# Patient Record
Sex: FEMALE | Race: WHITE | Employment: UNEMPLOYED | URBAN - METROPOLITAN AREA
[De-identification: names, ages, dates, MRNs, and addresses within clinical notes are randomized per-mention and may not be internally consistent; named-entity substitution may affect disease eponyms.]

---

## 2019-08-12 ENCOUNTER — OFFICE VISIT (OUTPATIENT)
Dept: FAMILY MEDICINE CLINIC | Facility: CLINIC | Age: 24
End: 2019-08-12
Payer: COMMERCIAL

## 2019-08-12 DIAGNOSIS — Z11.1 SCREENING EXAMINATION FOR PULMONARY TUBERCULOSIS: Primary | ICD-10-CM

## 2019-08-12 DIAGNOSIS — Z11.1 SCREENING FOR TUBERCULOSIS: ICD-10-CM

## 2019-08-12 PROCEDURE — 86580 TB INTRADERMAL TEST: CPT

## 2019-08-12 NOTE — PROGRESS NOTES
Requests 2 stepTB skin test.  TUBERCULOSIS SCREENING QUESTIONNAIRE    · Live vaccines in the past month? no  · Any steroid medication in the past month? no  · History of BCG vaccine? no  · If female, are you currently pregnant?   no    · Are you curren

## 2019-08-14 ENCOUNTER — OFFICE VISIT (OUTPATIENT)
Dept: FAMILY MEDICINE CLINIC | Facility: CLINIC | Age: 24
End: 2019-08-14
Payer: COMMERCIAL

## 2019-08-14 DIAGNOSIS — Z11.1 SCREENING FOR TUBERCULOSIS: Primary | ICD-10-CM

## 2019-08-14 LAB — INDURATION (): 0 MM (ref 0–11)

## 2019-08-14 NOTE — PROGRESS NOTES
Patient presents for PPD read. Test placed on right forearm    Results: 0 mm induration. Letter of results printed and given patient.  States does thinks she needs a second PPD test, explained no earlier than a week prior to the first being given, p

## 2020-09-22 ENCOUNTER — OFFICE VISIT (OUTPATIENT)
Dept: OBGYN CLINIC | Facility: CLINIC | Age: 25
End: 2020-09-22
Payer: COMMERCIAL

## 2020-09-22 VITALS
HEIGHT: 67 IN | WEIGHT: 144.88 LBS | DIASTOLIC BLOOD PRESSURE: 64 MMHG | SYSTOLIC BLOOD PRESSURE: 102 MMHG | BODY MASS INDEX: 22.74 KG/M2

## 2020-09-22 DIAGNOSIS — Z00.00 ANNUAL PHYSICAL EXAM: Primary | ICD-10-CM

## 2020-09-22 PROBLEM — Z86.19 HISTORY OF HERPES GENITALIS: Status: ACTIVE | Noted: 2020-09-22

## 2020-09-22 PROCEDURE — 87591 N.GONORRHOEAE DNA AMP PROB: CPT | Performed by: OBSTETRICS & GYNECOLOGY

## 2020-09-22 PROCEDURE — 87491 CHLMYD TRACH DNA AMP PROBE: CPT | Performed by: OBSTETRICS & GYNECOLOGY

## 2020-09-22 PROCEDURE — 3008F BODY MASS INDEX DOCD: CPT | Performed by: OBSTETRICS & GYNECOLOGY

## 2020-09-22 PROCEDURE — 87106 FUNGI IDENTIFICATION YEAST: CPT | Performed by: OBSTETRICS & GYNECOLOGY

## 2020-09-22 PROCEDURE — 3078F DIAST BP <80 MM HG: CPT | Performed by: OBSTETRICS & GYNECOLOGY

## 2020-09-22 PROCEDURE — 99385 PREV VISIT NEW AGE 18-39: CPT | Performed by: OBSTETRICS & GYNECOLOGY

## 2020-09-22 PROCEDURE — 90471 IMMUNIZATION ADMIN: CPT | Performed by: OBSTETRICS & GYNECOLOGY

## 2020-09-22 PROCEDURE — 87808 TRICHOMONAS ASSAY W/OPTIC: CPT | Performed by: OBSTETRICS & GYNECOLOGY

## 2020-09-22 PROCEDURE — 3074F SYST BP LT 130 MM HG: CPT | Performed by: OBSTETRICS & GYNECOLOGY

## 2020-09-22 PROCEDURE — 90686 IIV4 VACC NO PRSV 0.5 ML IM: CPT | Performed by: OBSTETRICS & GYNECOLOGY

## 2020-09-22 PROCEDURE — 87205 SMEAR GRAM STAIN: CPT | Performed by: OBSTETRICS & GYNECOLOGY

## 2020-09-22 RX ORDER — LEVONORGESTREL 52 MG/1
1 INTRAUTERINE DEVICE INTRAUTERINE ONCE
COMMUNITY

## 2020-09-22 NOTE — H&P
[facility]  Obstetrics and Gynecology  History & Physical    CC: Patient presents with:  New Patient  Annual: pap       Subjective:     HPI: Brigette Ridley is a 21year old [place] female here for a well women exam. Patient reports Smoking status: Never Smoker      Smokeless tobacco: Never Used    Substance and Sexual Activity      Alcohol use: Yes        Frequency: 2-4 times a month      Drug use: Never      Sexual activity: Not on file    Lifestyle      Physical activity        D Body mass index is 22.69 kg/m².     Exam with MA codie Rosa Grams present:   GENERAL: well developed, well nourished, in no apparent distress, alert and orientated X 3  PSYCH: mood and affect stable   SKIN: no rashes, no lesions  HEENT: tracey

## 2020-09-23 LAB
C TRACH DNA SPEC QL NAA+PROBE: NEGATIVE
N GONORRHOEA DNA SPEC QL NAA+PROBE: NEGATIVE

## 2020-09-24 LAB
GENITAL VAGINOSIS SCREEN: NEGATIVE
TRICHOMONAS SCREEN: NEGATIVE

## 2020-09-24 RX ORDER — FLUCONAZOLE 150 MG/1
150 TABLET ORAL ONCE
Qty: 2 TABLET | Refills: 0 | Status: SHIPPED | OUTPATIENT
Start: 2020-09-24 | End: 2020-09-24

## 2020-10-01 ENCOUNTER — OFFICE VISIT (OUTPATIENT)
Dept: FAMILY MEDICINE CLINIC | Facility: CLINIC | Age: 25
End: 2020-10-01
Payer: COMMERCIAL

## 2020-10-01 VITALS
OXYGEN SATURATION: 98 % | BODY MASS INDEX: 22.76 KG/M2 | HEART RATE: 80 BPM | DIASTOLIC BLOOD PRESSURE: 78 MMHG | WEIGHT: 145 LBS | SYSTOLIC BLOOD PRESSURE: 118 MMHG | TEMPERATURE: 95 F | HEIGHT: 67 IN

## 2020-10-01 DIAGNOSIS — G43.109 MIGRAINE WITH AURA AND WITHOUT STATUS MIGRAINOSUS, NOT INTRACTABLE: ICD-10-CM

## 2020-10-01 DIAGNOSIS — Z00.00 PHYSICAL EXAM, ANNUAL: Primary | ICD-10-CM

## 2020-10-01 PROCEDURE — 3074F SYST BP LT 130 MM HG: CPT | Performed by: FAMILY MEDICINE

## 2020-10-01 PROCEDURE — 3008F BODY MASS INDEX DOCD: CPT | Performed by: FAMILY MEDICINE

## 2020-10-01 PROCEDURE — 3078F DIAST BP <80 MM HG: CPT | Performed by: FAMILY MEDICINE

## 2020-10-01 PROCEDURE — 99385 PREV VISIT NEW AGE 18-39: CPT | Performed by: FAMILY MEDICINE

## 2020-10-01 NOTE — PROGRESS NOTES
HPI:   Nelson Renae is a 21year old female who presents for a complete physical exam.     Migraines. Dx at 6yo. Takes motrin prn and works. Does have aura of loss of vision, numbness/tingling, and aphasic. Had MRI head as a child.      Dx with gen History    Tobacco Use      Smoking status: Never Smoker      Smokeless tobacco: Never Used    Alcohol use: Yes      Frequency: 2-4 times a month    Drug use: Never           EXAM:   Wt Readings from Last 6 Encounters:  10/01/20 : 145 lb (65.8 kg)  09/22/2 (GC/Chlamydia/HIV): recently negative    All questions were answered during the visit and the patient verbalizes understanding. She will return in one year for next [address] or sooner as needed.     Meds & Refills for this Visit:  Requested Prescriptions      No

## 2020-10-20 ENCOUNTER — NURSE ONLY (OUTPATIENT)
Dept: URGENT CARE | Age: 25
End: 2020-10-20

## 2020-10-20 ENCOUNTER — TELEPHONE (OUTPATIENT)
Dept: SCHEDULING | Age: 25
End: 2020-10-20

## 2020-10-20 VITALS — TEMPERATURE: 97.7 F

## 2020-10-20 DIAGNOSIS — Z11.1 SCREENING-PULMONARY TB: Primary | ICD-10-CM

## 2020-10-20 PROCEDURE — 86580 TB INTRADERMAL TEST: CPT | Performed by: NURSE PRACTITIONER

## 2020-10-22 ENCOUNTER — WALK IN (OUTPATIENT)
Dept: URGENT CARE | Age: 25
End: 2020-10-22

## 2020-10-22 VITALS — TEMPERATURE: 98.1 F

## 2020-10-22 DIAGNOSIS — Z11.1 SCREENING EXAMINATION FOR PULMONARY TUBERCULOSIS: Primary | ICD-10-CM

## 2020-10-22 LAB
INDURATION: 0 MM (ref 0–?)
SKIN TEST RESULT: NEGATIVE

## 2020-10-22 PROCEDURE — X1094 NO CHARGE VISIT: HCPCS | Performed by: NURSE PRACTITIONER

## 2021-11-09 ENCOUNTER — OFFICE VISIT (OUTPATIENT)
Dept: FAMILY MEDICINE CLINIC | Facility: CLINIC | Age: 26
End: 2021-11-09
Payer: COMMERCIAL

## 2021-11-09 VITALS
HEART RATE: 79 BPM | SYSTOLIC BLOOD PRESSURE: 112 MMHG | DIASTOLIC BLOOD PRESSURE: 62 MMHG | HEIGHT: 67 IN | WEIGHT: 147 LBS | BODY MASS INDEX: 23.07 KG/M2 | OXYGEN SATURATION: 99 %

## 2021-11-09 DIAGNOSIS — G43.109 MIGRAINE WITH AURA AND WITHOUT STATUS MIGRAINOSUS, NOT INTRACTABLE: ICD-10-CM

## 2021-11-09 DIAGNOSIS — Z00.00 PHYSICAL EXAM, ANNUAL: Primary | ICD-10-CM

## 2021-11-09 PROCEDURE — 3074F SYST BP LT 130 MM HG: CPT | Performed by: FAMILY MEDICINE

## 2021-11-09 PROCEDURE — 99395 PREV VISIT EST AGE 18-39: CPT | Performed by: FAMILY MEDICINE

## 2021-11-09 PROCEDURE — 90471 IMMUNIZATION ADMIN: CPT | Performed by: FAMILY MEDICINE

## 2021-11-09 PROCEDURE — 90686 IIV4 VACC NO PRSV 0.5 ML IM: CPT | Performed by: FAMILY MEDICINE

## 2021-11-09 PROCEDURE — 3078F DIAST BP <80 MM HG: CPT | Performed by: FAMILY MEDICINE

## 2021-11-09 PROCEDURE — 3008F BODY MASS INDEX DOCD: CPT | Performed by: FAMILY MEDICINE

## 2021-11-09 NOTE — PROGRESS NOTES
Attempted to contact pt. No answer. LVM with lab result. Advised in VM will be mailing external labs. Advised to call clinic back w/ further questions/concerns.       HPI:   Guzman Mahajan is a 31year old female who presents for a complete physical exam.     Migraines: Would like to see neurology. Gets 5-10 per month now. More frequent since started work last year. Has aura w/ vision changes and numb hands.  Takes mo Encounters:  11/09/21 : 147 lb (66.7 kg)  10/01/20 : 145 lb (65.8 kg)  09/22/20 : 144 lb 14.4 oz (65.7 kg)    Body mass index is 23.02 kg/m².    /62   Pulse 79   Ht 5' 7\" (1.702 m)   Wt 147 lb (66.7 kg)   LMP 11/02/2021   SpO2 99%   Breastfeeding No discuss with OBGYN    All questions were answered during the visit and the patient verbalizes understanding. She will return in one year for next [address] or sooner as needed.     Meds & Refills for this Visit:  Requested Prescriptions      No prescriptions requ

## 2021-11-23 ENCOUNTER — OFFICE VISIT (OUTPATIENT)
Dept: OBGYN CLINIC | Facility: CLINIC | Age: 26
End: 2021-11-23
Payer: COMMERCIAL

## 2021-11-23 VITALS
HEIGHT: 67 IN | WEIGHT: 144.31 LBS | BODY MASS INDEX: 22.65 KG/M2 | SYSTOLIC BLOOD PRESSURE: 124 MMHG | DIASTOLIC BLOOD PRESSURE: 72 MMHG

## 2021-11-23 DIAGNOSIS — Z30.431 IUD CHECK UP: Primary | ICD-10-CM

## 2021-11-23 PROCEDURE — 3008F BODY MASS INDEX DOCD: CPT | Performed by: OBSTETRICS & GYNECOLOGY

## 2021-11-23 PROCEDURE — 3074F SYST BP LT 130 MM HG: CPT | Performed by: OBSTETRICS & GYNECOLOGY

## 2021-11-23 PROCEDURE — 99395 PREV VISIT EST AGE 18-39: CPT | Performed by: OBSTETRICS & GYNECOLOGY

## 2021-11-23 PROCEDURE — 3078F DIAST BP <80 MM HG: CPT | Performed by: OBSTETRICS & GYNECOLOGY

## 2021-11-23 NOTE — PROGRESS NOTES
[facility]  Obstetrics and Gynecology  Annual Follow Up    Subjective:     Jonn Guillen is a 31year old [place] [facility] female who presents with c/o Patient presents with:   Annual: physical     The patient reports no complaints annual    Patient Active Problem List:     History of herpes genitalis     Migraine with aura and without status migrainosus, not intractable        Plan:     Annual   - Normal breast and pelvic exam   - US for lost IUD strings   - Procedure visit for left

## 2022-05-10 ENCOUNTER — PATIENT MESSAGE (OUTPATIENT)
Dept: FAMILY MEDICINE CLINIC | Facility: CLINIC | Age: 27
End: 2022-05-10

## 2022-05-10 NOTE — TELEPHONE ENCOUNTER
Sent [employer] asking if patient needs TB skin test or quantiferon blood test.       ----- Message -----  From: Alexandrea Whaley  Sent: 5/10/2022   1:34 PM CDT  To: Em Rn Triage  Subject: TB Testing? Hello,     I need to get a TB test for work and wondering if I can get it done at the [facility]. location?      [facility]

## 2022-05-12 NOTE — TELEPHONE ENCOUNTER
Pt needs a nurse visit appt. She cannot complete the PPD test in our lab. Please have her call & schedule RN visit with my MA Ryanne Doyle.

## 2022-10-26 ENCOUNTER — OFFICE VISIT (OUTPATIENT)
Dept: OBGYN CLINIC | Facility: CLINIC | Age: 27
End: 2022-10-26
Payer: COMMERCIAL

## 2022-10-26 VITALS
BODY MASS INDEX: 23.28 KG/M2 | HEIGHT: 67 IN | WEIGHT: 148.31 LBS | SYSTOLIC BLOOD PRESSURE: 126 MMHG | DIASTOLIC BLOOD PRESSURE: 72 MMHG

## 2022-10-26 DIAGNOSIS — Z01.419 WOMEN'S ANNUAL ROUTINE GYNECOLOGICAL EXAMINATION: Primary | ICD-10-CM

## 2022-10-26 PROCEDURE — 87808 TRICHOMONAS ASSAY W/OPTIC: CPT | Performed by: OBSTETRICS & GYNECOLOGY

## 2022-10-26 PROCEDURE — 87106 FUNGI IDENTIFICATION YEAST: CPT | Performed by: OBSTETRICS & GYNECOLOGY

## 2022-10-26 PROCEDURE — 3074F SYST BP LT 130 MM HG: CPT | Performed by: OBSTETRICS & GYNECOLOGY

## 2022-10-26 PROCEDURE — 3008F BODY MASS INDEX DOCD: CPT | Performed by: OBSTETRICS & GYNECOLOGY

## 2022-10-26 PROCEDURE — 3078F DIAST BP <80 MM HG: CPT | Performed by: OBSTETRICS & GYNECOLOGY

## 2022-10-26 PROCEDURE — 87591 N.GONORRHOEAE DNA AMP PROB: CPT | Performed by: OBSTETRICS & GYNECOLOGY

## 2022-10-26 PROCEDURE — 87491 CHLMYD TRACH DNA AMP PROBE: CPT | Performed by: OBSTETRICS & GYNECOLOGY

## 2022-10-26 PROCEDURE — 87205 SMEAR GRAM STAIN: CPT | Performed by: OBSTETRICS & GYNECOLOGY

## 2022-10-26 PROCEDURE — 99395 PREV VISIT EST AGE 18-39: CPT | Performed by: OBSTETRICS & GYNECOLOGY

## 2022-10-27 LAB
C TRACH DNA SPEC QL NAA+PROBE: NEGATIVE
N GONORRHOEA DNA SPEC QL NAA+PROBE: NEGATIVE

## 2022-10-28 LAB
GENITAL VAGINOSIS SCREEN: NEGATIVE
TRICHOMONAS SCREEN: NEGATIVE

## 2023-08-29 ENCOUNTER — TELEPHONE (OUTPATIENT)
Dept: OBGYN CLINIC | Facility: CLINIC | Age: 28
End: 2023-08-29

## 2023-11-08 ENCOUNTER — OFFICE VISIT (OUTPATIENT)
Dept: OBGYN CLINIC | Facility: CLINIC | Age: 28
End: 2023-11-08
Payer: COMMERCIAL

## 2023-11-08 VITALS
BODY MASS INDEX: 22.49 KG/M2 | DIASTOLIC BLOOD PRESSURE: 74 MMHG | SYSTOLIC BLOOD PRESSURE: 128 MMHG | WEIGHT: 143.31 LBS | HEIGHT: 67 IN

## 2023-11-08 DIAGNOSIS — Z01.419 ENCOUNTER FOR ANNUAL ROUTINE GYNECOLOGICAL EXAMINATION: Primary | ICD-10-CM

## 2023-11-08 PROCEDURE — 3008F BODY MASS INDEX DOCD: CPT | Performed by: OBSTETRICS & GYNECOLOGY

## 2023-11-08 PROCEDURE — 88175 CYTOPATH C/V AUTO FLUID REDO: CPT | Performed by: OBSTETRICS & GYNECOLOGY

## 2023-11-08 PROCEDURE — 99395 PREV VISIT EST AGE 18-39: CPT | Performed by: OBSTETRICS & GYNECOLOGY

## 2023-11-08 PROCEDURE — 90471 IMMUNIZATION ADMIN: CPT | Performed by: OBSTETRICS & GYNECOLOGY

## 2023-11-08 PROCEDURE — 90686 IIV4 VACC NO PRSV 0.5 ML IM: CPT | Performed by: OBSTETRICS & GYNECOLOGY

## 2023-11-08 PROCEDURE — 3078F DIAST BP <80 MM HG: CPT | Performed by: OBSTETRICS & GYNECOLOGY

## 2023-11-08 PROCEDURE — 3074F SYST BP LT 130 MM HG: CPT | Performed by: OBSTETRICS & GYNECOLOGY

## 2024-10-30 ENCOUNTER — OFFICE VISIT (OUTPATIENT)
Dept: OBGYN CLINIC | Facility: CLINIC | Age: 29
End: 2024-10-30
Payer: COMMERCIAL

## 2024-10-30 VITALS
SYSTOLIC BLOOD PRESSURE: 118 MMHG | BODY MASS INDEX: 22.46 KG/M2 | DIASTOLIC BLOOD PRESSURE: 68 MMHG | HEIGHT: 67 IN | WEIGHT: 143.13 LBS

## 2024-10-30 DIAGNOSIS — N90.89 VULVAR LESION: ICD-10-CM

## 2024-10-30 DIAGNOSIS — Z01.419 ENCOUNTER FOR ANNUAL ROUTINE GYNECOLOGICAL EXAMINATION: Primary | ICD-10-CM

## 2024-10-30 PROCEDURE — 11420 EXC H-F-NK-SP B9+MARG 0.5/<: CPT | Performed by: OBSTETRICS & GYNECOLOGY

## 2024-10-30 PROCEDURE — 88305 TISSUE EXAM BY PATHOLOGIST: CPT | Performed by: OBSTETRICS & GYNECOLOGY

## 2024-10-30 PROCEDURE — 3078F DIAST BP <80 MM HG: CPT | Performed by: OBSTETRICS & GYNECOLOGY

## 2024-10-30 PROCEDURE — 99395 PREV VISIT EST AGE 18-39: CPT | Performed by: OBSTETRICS & GYNECOLOGY

## 2024-10-30 PROCEDURE — 3074F SYST BP LT 130 MM HG: CPT | Performed by: OBSTETRICS & GYNECOLOGY

## 2024-10-30 PROCEDURE — 3008F BODY MASS INDEX DOCD: CPT | Performed by: OBSTETRICS & GYNECOLOGY

## 2024-10-30 NOTE — PROCEDURES
Vulvar excision procedure note    Consent signed.  Procedure discussed with patient in detail including indication, risk, benefits, alternatives and complications.    Procedure:  About 5 mm Vulvar lesion noted on left labia majora.   1% lidocaine with epinephrine injected.   Grasped and elevated and excised with scissors.   Silver nitrate to base.   Hemostasis noted.       Findings:  Left labia skin tag    Impression:  Likely begin skin tag.     Dr. Misael Acosta MD    [facility]     This note was created by [employer] voice recognition. Errors in content may be related to improper recognition by the system; efforts to review and correct have been done but errors may still exist. Please be advised the primary purpose of this note is for me to communicate medical care. Standard sentence structure is not always used. Medical terminology and medical abbreviations may be used. There may be grammatical, typographical, and automated fill ins that may have errors missed in proofreading.

## 2024-10-30 NOTE — PROGRESS NOTES
[facility]  Obstetrics and Gynecology  Annual Follow Up    Subjective:     Marlena Hicks is a 29 year old  female who presents with c/o   Chief Complaint   Patient presents with    Annual     The patient reports menses are monthly. Never had amenorrhea with IUD. IUD placed 2017 Recommend removal and replacement by 2025. No self breast exam concerns. Pap up to date.     Noted her left labia skin tag is bothersome and irritating.      Depression Screening (PHQ-2/PHQ-9): Over the LAST 2 WEEKS   Little interest or pleasure in doing things (over the last two weeks)?: Not at all  Little interest or pleasure in doing things: Not at all    Feeling down, depressed, or hopeless (over the last two weeks)?: Not at all  Feeling down, depressed, or hopeless: Not at all    PHQ-2 SCORE: 0  PHQ-2 SCORE: 0         Review of Systems:  General: no complaints  Eyes: no complaints  Respiratory: no complaints  Cardiovascular: no complaints  GI: no complaints  : See HPI  Hematological/lymphatic: no complaints  Breast: no complaints  Psychiatric: no complaints  Endocrine:no complaints  Neurological: no complaints  Immunological: no complaints  Musculoskeletal:no complaints    HISTORY:  Past Medical History:    Genital herpes simplex    Migraine with aura      Past Surgical History:   Procedure Laterality Date    Knee surgery      ACL repair [employer] year of [employer] school      Family History   Problem Relation Age of Onset    Hypertension Mother     High Cholesterol Mother     Hypertension Father     High Cholesterol Father       Social History     Socioeconomic History    Marital status: Single   Tobacco Use    Smoking status: Never    Smokeless tobacco: Never   Substance and Sexual Activity    Alcohol use: Yes    Drug use: Never    Sexual activity: Yes     Birth control/protection: I.U.D.     Comment: Mirena   Other Topics Concern    Blood Transfusions No   Social History Narrative    Lives  with partner a dog and 2 cats    No abuse           Objective:     Vitals:    10/30/24 1630   BP: 118/68   Weight: 143 lb 1.6 oz (64.9 kg)   Height: 67\"         Body mass index is 22.41 kg/m².    Exam with MA codie Kostasarabellaadriane Haywood present:   GENERAL: well developed, well nourished, in no apparent distress, alert and orientated X 3  PSYCH: mood and affect stable   SKIN: no rashes, no lesions  LUNGS: respiration unlabored  CARDIOVASCULAR: no peripheral edema or varicosities, skin warm and dry  BREASTS: bilaterally nontender, no palpable masses, no nipple discharge with bilateral piercing's in place, no skin changes, no axillary adenopathy  ABDOMEN: Soft, non distended; non tender, no masses  GYNE/:   External Genitalia: normal, stable left labia majora skin tag (removed see procedure note), good perineal support  Urethra: meatus normal   Bladder: well supported  Vagina: normal mucosa, no lesions, normal discharge   Cervix: normal os, no lesions or bleeding. Small amount of strings seen at the os.   Cul-de-sac: normal  R/V: normal perineum, no hemorrhoids  EXTREMITIES:  Normal range of motion, strength 5/5 walking.     Labs:  Reviewed last pap.     Assessment:     Marlena Hicks is a 29 year old  female who presents for Annual    Patient Active Problem List   Diagnosis    History of herpes genitalis    Migraine with aura and without status migrainosus, not intractable         Plan:     Annual  - Normal breast and pelvic exam.   - Pap up to date.     2.   Vulvar lesion  - Likely consistent with skin tag.   - Removed today. See procedure note.      All of the findings and plan were discussed with the patient.  She notes understanding and agrees with the plan of care.  All questions were answered to the best of my ability at this time.    RTC in 1 year or sooner if needed    Dr. Misael Acosta MD    [facility]     This note was created by [email or web address] voice recognition. Errors in content may be related to  improper recognition by the system; efforts to review and correct have been done but errors may still exist. Please be advised the primary purpose of this note is for me to communicate medical care. Standard sentence structure is not always used. Medical terminology and medical abbreviations may be used. There may be grammatical, typographical, and automated fill ins that may have errors missed in proofreading.

## (undated) NOTE — LETTER
August 14, 2019    Sweetie Bela  [address] [place] [address]      Dear Margy Pickering: The following are the results of your recent tests. Please review the list of test results.   Your result is the value on the left; we have also supplie

## (undated) NOTE — LETTER
Marlena Juan Hicks, :10/7/1995    CONSENT FOR PROCEDURE/SEDATION    1. I authorize the performance upon Marlena Hicks  the following: Excision Lesion/Genital    2. I authorize Dr. Misael Acosta MD (and whomever is designated as the doctor’s assistant), to perform the above-mentioned procedures.    3. If any unforeseen conditions arise during this procedure calling for additional  procedures, operations, or medications (including anesthesia and blood transfusion), I further request and authorize the doctor to do whatever he/she deems advisable in my interest.    6. I have been informed by my doctor of the nature and purpose of this procedure sedation, possible alternative methods of treatment, risk involved and possible complications.    Signature of Patient:_______________________________________________    Signature of person authorized to consent for patient:  _______________________________________________________________    Relationship to patient: ____________________________________________    Witness: _________________________________________ Date:___________     Physician Signature: _______________________________ Date:___________

## (undated) NOTE — MR AVS SNAPSHOT
After Visit Summary   9/22/2020    Fernando Oliveros    MRN: [id number]           Visit Information     Date & Time  9/22/2020 10:15 AM Provider  Brandie Velez MD [address] [facility] Dept.  Phone  [phone] If you receive a survey from [employer], please take a few minutes to complete it and provide feedback. We strive to deliver the best patient experience and are looking for ways to make improvements. Your feedback will help us do so.  For more information EMERGENCY ROOM Life-threatening emergencies needing immediate intervention at a hospital emergency room.  Average cost  $2,300*   *Cost varies based on your insurance coverage  For more information about hours, locations or appointment options available at